# Patient Record
Sex: MALE | Race: WHITE | NOT HISPANIC OR LATINO | Employment: OTHER | URBAN - NONMETROPOLITAN AREA
[De-identification: names, ages, dates, MRNs, and addresses within clinical notes are randomized per-mention and may not be internally consistent; named-entity substitution may affect disease eponyms.]

---

## 2024-08-02 ENCOUNTER — HOSPITAL ENCOUNTER (EMERGENCY)
Facility: HOSPITAL | Age: 39
End: 2024-08-03
Attending: EMERGENCY MEDICINE
Payer: OTHER GOVERNMENT

## 2024-08-02 DIAGNOSIS — T18.128A ESOPHAGEAL OBSTRUCTION DUE TO FOOD IMPACTION: Primary | ICD-10-CM
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
## 2024-08-02 PROCEDURE — 99284 EMERGENCY DEPT VISIT MOD MDM: CPT | Performed by: EMERGENCY MEDICINE

## 2024-08-02 PROCEDURE — 96361 HYDRATE IV INFUSION ADD-ON: CPT

## 2024-08-02 PROCEDURE — 99282 EMERGENCY DEPT VISIT SF MDM: CPT

## 2024-08-02 RX ORDER — NITROGLYCERIN 0.4 MG/1
0.4 TABLET SUBLINGUAL ONCE
Status: COMPLETED | OUTPATIENT
Start: 2024-08-03 | End: 2024-08-03

## 2024-08-02 RX ADMIN — SODIUM CHLORIDE 1000 ML: 0.9 INJECTION, SOLUTION INTRAVENOUS at 23:59

## 2024-08-03 ENCOUNTER — APPOINTMENT (OUTPATIENT)
Dept: GASTROENTEROLOGY | Facility: HOSPITAL | Age: 39
End: 2024-08-03

## 2024-08-03 ENCOUNTER — ANESTHESIA (OUTPATIENT)
Dept: GASTROENTEROLOGY | Facility: HOSPITAL | Age: 39
End: 2024-08-03

## 2024-08-03 ENCOUNTER — HOSPITAL ENCOUNTER (OUTPATIENT)
Facility: HOSPITAL | Age: 39
Setting detail: OBSERVATION
Discharge: HOME | End: 2024-08-03
Attending: FAMILY MEDICINE | Admitting: STUDENT IN AN ORGANIZED HEALTH CARE EDUCATION/TRAINING PROGRAM

## 2024-08-03 ENCOUNTER — ANESTHESIA EVENT (OUTPATIENT)
Dept: GASTROENTEROLOGY | Facility: HOSPITAL | Age: 39
End: 2024-08-03

## 2024-08-03 VITALS
HEART RATE: 110 BPM | WEIGHT: 231 LBS | DIASTOLIC BLOOD PRESSURE: 67 MMHG | OXYGEN SATURATION: 98 % | BODY MASS INDEX: 34.21 KG/M2 | TEMPERATURE: 97.2 F | SYSTOLIC BLOOD PRESSURE: 111 MMHG | HEIGHT: 69 IN | RESPIRATION RATE: 16 BRPM

## 2024-08-03 VITALS
HEIGHT: 69 IN | SYSTOLIC BLOOD PRESSURE: 128 MMHG | HEART RATE: 95 BPM | TEMPERATURE: 97.9 F | OXYGEN SATURATION: 93 % | DIASTOLIC BLOOD PRESSURE: 82 MMHG | RESPIRATION RATE: 17 BRPM | WEIGHT: 231.26 LBS | BODY MASS INDEX: 34.25 KG/M2

## 2024-08-03 DIAGNOSIS — T18.128A ESOPHAGEAL OBSTRUCTION DUE TO FOOD IMPACTION: Primary | ICD-10-CM

## 2024-08-03 DIAGNOSIS — K21.9 GASTROESOPHAGEAL REFLUX DISEASE WITHOUT ESOPHAGITIS: ICD-10-CM
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
## 2024-08-03 PROBLEM — K29.80 DUODENITIS: Status: ACTIVE | Noted: 2024-08-03

## 2024-08-03 PROBLEM — K21.00 GASTROESOPHAGEAL REFLUX DISEASE WITH ESOPHAGITIS WITHOUT HEMORRHAGE: Status: ACTIVE | Noted: 2024-08-03

## 2024-08-03 PROCEDURE — 43239 EGD BIOPSY SINGLE/MULTIPLE: CPT | Performed by: STUDENT IN AN ORGANIZED HEALTH CARE EDUCATION/TRAINING PROGRAM

## 2024-08-03 PROCEDURE — 88305 TISSUE EXAM BY PATHOLOGIST: CPT | Performed by: PATHOLOGY

## 2024-08-03 PROCEDURE — 99222 1ST HOSP IP/OBS MODERATE 55: CPT | Performed by: STUDENT IN AN ORGANIZED HEALTH CARE EDUCATION/TRAINING PROGRAM

## 2024-08-03 PROCEDURE — 99236 HOSP IP/OBS SAME DATE HI 85: CPT | Performed by: STUDENT IN AN ORGANIZED HEALTH CARE EDUCATION/TRAINING PROGRAM

## 2024-08-03 PROCEDURE — G0379 DIRECT REFER HOSPITAL OBSERV: HCPCS

## 2024-08-03 PROCEDURE — 96375 TX/PRO/DX INJ NEW DRUG ADDON: CPT

## 2024-08-03 PROCEDURE — 96374 THER/PROPH/DIAG INJ IV PUSH: CPT

## 2024-08-03 PROCEDURE — 96361 HYDRATE IV INFUSION ADD-ON: CPT

## 2024-08-03 RX ORDER — LIDOCAINE HYDROCHLORIDE 10 MG/ML
INJECTION, SOLUTION EPIDURAL; INFILTRATION; INTRACAUDAL; PERINEURAL AS NEEDED
Status: DISCONTINUED | OUTPATIENT
Start: 2024-08-03 | End: 2024-08-03

## 2024-08-03 RX ORDER — SODIUM CHLORIDE, SODIUM LACTATE, POTASSIUM CHLORIDE, CALCIUM CHLORIDE 600; 310; 30; 20 MG/100ML; MG/100ML; MG/100ML; MG/100ML
INJECTION, SOLUTION INTRAVENOUS CONTINUOUS PRN
Status: DISCONTINUED | OUTPATIENT
Start: 2024-08-03 | End: 2024-08-03

## 2024-08-03 RX ORDER — NORTRIPTYLINE HYDROCHLORIDE 50 MG/1
100 CAPSULE ORAL
COMMUNITY

## 2024-08-03 RX ORDER — MIDAZOLAM HYDROCHLORIDE 2 MG/2ML
INJECTION, SOLUTION INTRAMUSCULAR; INTRAVENOUS AS NEEDED
Status: DISCONTINUED | OUTPATIENT
Start: 2024-08-03 | End: 2024-08-03

## 2024-08-03 RX ORDER — GLYCOPYRROLATE 0.2 MG/ML
INJECTION INTRAMUSCULAR; INTRAVENOUS AS NEEDED
Status: DISCONTINUED | OUTPATIENT
Start: 2024-08-03 | End: 2024-08-03

## 2024-08-03 RX ORDER — TRAZODONE HYDROCHLORIDE 150 MG/1
150 TABLET ORAL
COMMUNITY

## 2024-08-03 RX ORDER — PANTOPRAZOLE SODIUM 40 MG/1
40 TABLET, DELAYED RELEASE ORAL 2 TIMES DAILY
Qty: 60 TABLET | Refills: 0 | Status: SHIPPED | OUTPATIENT
Start: 2024-08-03 | End: 2024-09-02

## 2024-08-03 RX ORDER — ONDANSETRON 2 MG/ML
INJECTION INTRAMUSCULAR; INTRAVENOUS AS NEEDED
Status: DISCONTINUED | OUTPATIENT
Start: 2024-08-03 | End: 2024-08-03

## 2024-08-03 RX ORDER — METOCLOPRAMIDE HYDROCHLORIDE 5 MG/ML
INJECTION INTRAMUSCULAR; INTRAVENOUS AS NEEDED
Status: DISCONTINUED | OUTPATIENT
Start: 2024-08-03 | End: 2024-08-03

## 2024-08-03 RX ORDER — PROPOFOL 10 MG/ML
INJECTION, EMULSION INTRAVENOUS AS NEEDED
Status: DISCONTINUED | OUTPATIENT
Start: 2024-08-03 | End: 2024-08-03

## 2024-08-03 RX ORDER — ONDANSETRON 2 MG/ML
4 INJECTION INTRAMUSCULAR; INTRAVENOUS ONCE
Status: COMPLETED | OUTPATIENT
Start: 2024-08-03 | End: 2024-08-03

## 2024-08-03 RX ADMIN — SODIUM CHLORIDE, SODIUM LACTATE, POTASSIUM CHLORIDE, AND CALCIUM CHLORIDE: .6; .31; .03; .02 INJECTION, SOLUTION INTRAVENOUS at 11:13

## 2024-08-03 RX ADMIN — METOCLOPRAMIDE 10 MG: 5 INJECTION, SOLUTION INTRAMUSCULAR; INTRAVENOUS at 11:13

## 2024-08-03 RX ADMIN — PROPOFOL 50 MG: 10 INJECTION, EMULSION INTRAVENOUS at 11:19

## 2024-08-03 RX ADMIN — MIDAZOLAM 2 MG: 1 INJECTION INTRAMUSCULAR; INTRAVENOUS at 11:13

## 2024-08-03 RX ADMIN — ONDANSETRON 4 MG: 2 INJECTION INTRAMUSCULAR; INTRAVENOUS at 00:15

## 2024-08-03 RX ADMIN — GLYCOPYRROLATE 0.2 MG: 0.2 INJECTION, SOLUTION INTRAMUSCULAR; INTRAVENOUS at 11:13

## 2024-08-03 RX ADMIN — NITROGLYCERIN 0.4 MG: 0.4 TABLET SUBLINGUAL at 00:01

## 2024-08-03 RX ADMIN — PROPOFOL 200 MG: 10 INJECTION, EMULSION INTRAVENOUS at 11:17

## 2024-08-03 RX ADMIN — GLUCAGON 1 MG: 1 INJECTION, POWDER, LYOPHILIZED, FOR SOLUTION INTRAMUSCULAR; INTRAVENOUS at 00:01

## 2024-08-03 RX ADMIN — PROPOFOL 100 MG: 10 INJECTION, EMULSION INTRAVENOUS at 11:22

## 2024-08-03 RX ADMIN — LIDOCAINE HYDROCHLORIDE 100 MG: 10 INJECTION, SOLUTION EPIDURAL; INFILTRATION; INTRACAUDAL; PERINEURAL at 11:17

## 2024-08-03 RX ADMIN — ONDANSETRON 4 MG: 2 INJECTION INTRAMUSCULAR; INTRAVENOUS at 11:13

## 2024-08-03 NOTE — ASSESSMENT & PLAN NOTE
Patient presents as a transfer from [facility] for GI evaluation for esophageal obstruction due to food impaction.   At [facility], received SL Nitro and Glucagon with attempt to dislodge food bolus with carbonated beverage however this was unsuccessful  Transferred for EGD by GI today  Keep NPO until EGD completed

## 2024-08-03 NOTE — DISCHARGE SUMMARY
[facility]  Discharge- Carlos Fay 1985, 39 y.o. male MRN: [id number]  Unit/Bed#: [place] [address] -01 Encounter: [id number]  Primary Care Provider: No primary care provider on file.   Date and time admitted to hospital: 8/3/2024  9:36 AM    * Esophageal obstruction due to food impaction  Assessment & Plan  Patient presents as a transfer from [facility] for GI evaluation for esophageal obstruction due to food impaction.   At [facility], received SL Nitro and Glucagon with attempt to dislodge food bolus with carbonated beverage however this was unsuccessful  Transferred for EGD by GI today, no food impaction found  Found to have esophagitis in GE junction and duodenitis for which biopsies were performed  Random biopsy of stomach performed to rule out H. Pylori  Follow-up with GI in 1-2 weeks  Rest of plan under GERD/duodenitis    Duodenitis  Assessment & Plan  Found on EGD today  Biopsies taken  Outpatient follow-up with GI  Start Pantoprazole 40 mg twice daily x 1 month    Gastroesophageal reflux disease with esophagitis without hemorrhage  Assessment & Plan  EGD showing Esophagitis, biopsies performed  Start PPI twice daily for 1 month  Outpatient follow-up with GI in 1-2 weeks      Medical Problems      Discharging Physician / Practitioner: Umu Benavides MD  PCP: No primary care provider on file.  Admission Date:   Admission Orders (From admission, onward)       Ordered        08/03/24 1051  Place in Observation  Once                          Discharge Date: 08/03/24    Consultations During Hospital Stay:  GI    Procedures Performed:   EGD    Significant Findings / Test Results: EGD  Grade B esophagitis in the GE junction  Mild erythematous mucosa in the duodenal bulb and 1st part of the duodenum  The upper third of the esophagus and middle third of the esophagus appeared normal. Performed random biopsy to rule out eosinophilic esophagitis.  The cardia, fundus of the  "stomach, body of the stomach, greater curve of the stomach, lesser curve of the stomach, incisura, antrum, prepyloric region and pylorus appeared normal. Performed random biopsy to rule out H. pylori.  The 2nd part of the duodenum appeared normal.    Test Results Pending at Discharge (will require follow up):   Biopsies from esophagus, duodenum and stomach     Outpatient Tests Requested:  Will likely need repeat EGD to ensure resolution of above    Complications:  none    Reason for Admission: esophageal food impaction    Hospital Course:   Carlos Fay is a 39 y.o. male patient who originally presented to the hospital on 8/3/2024 due to globus sensation and vomiting following heavy meal and eating very fast. Presented to [facility] initially however attempts to relieve patient's symptoms were unsuccessful. Given lack of GI capabilities over the weekend, patient was transferred to [facility] for further evaluation. EGD was performed showing no food impaction and patient found to have esophagitis and duodenitis for which biopsies were performed. Patient stable for discharge home at this time and instructed to start PPI twice daily x 1 month. He will need to follow-up with GI in 1-2 weeks following discharge and will likely need repeat EGD in 6-8 weeks to evaluate for resolution.     Please see above list of diagnoses and related plan for additional information.     Condition at Discharge: stable    Discharge Day Visit / Exam:   Subjective:  Patient seen and examined at bedside. Denies any complaints at this time. States that his throat still feels a bit sore.     Vitals: Blood Pressure: 111/67 (08/03/24 1143)  Pulse: (!) 110 (08/03/24 1143)  Temperature: (!) 97.2 °F (36.2 °C) (08/03/24 1138)  Temp Source: Temporal (08/03/24 1143)  Respirations: 16 (08/03/24 1143)  Height: 5' 9\" (175.3 cm) (08/03/24 0958)  Weight - Scale: 105 kg (231 lb) (08/03/24 0958)  SpO2: 98 % (08/03/24 1143)    Exam:   Physical Exam  Vitals and nursing " note reviewed.   Constitutional:       General: He is not in acute distress.     Appearance: He is well-developed.   HENT:      Head: Normocephalic and atraumatic.   Eyes:      Conjunctiva/sclera: Conjunctivae normal.   Cardiovascular:      Rate and Rhythm: Normal rate and regular rhythm.      Heart sounds: No murmur heard.  Pulmonary:      Effort: Pulmonary effort is normal. No respiratory distress.      Breath sounds: Normal breath sounds.   Abdominal:      Palpations: Abdomen is soft.      Tenderness: There is no abdominal tenderness.   Musculoskeletal:         General: No swelling.      Cervical back: Neck supple.   Skin:     General: Skin is warm and dry.      Capillary Refill: Capillary refill takes less than 2 seconds.   Neurological:      Mental Status: He is alert.   Psychiatric:         Mood and Affect: Mood normal.            Discussion with Family: Updated  (wife, father, and mother) at bedside.    Discharge instructions/Information to patient and family:   See after visit summary for information provided to patient and family.      Provisions for Follow-Up Care:  See after visit summary for information related to follow-up care and any pertinent home health orders.      Mobility at time of Discharge:   Basic Mobility Inpatient Raw Score: 24  JH-HLM Goal: 8: Walk 250 feet or more  JH-HLM Achieved: 7: Walk 25 feet or more  Ambulates with no issues     Disposition:   Home    Planned Readmission: none     Discharge Statement:  I spent 65 minutes discharging the patient. This time was spent on the day of discharge. I had direct contact with the patient on the day of discharge. Greater than 50% of the total time was spent examining patient, answering all patient questions, arranging and discussing plan of care with patient as well as directly providing post-discharge instructions.  Additional time then spent on discharge activities.    Discharge Medications:  See after visit summary for  reconciled discharge medications provided to patient and/or family.      **Please Note: This note may have been constructed using a voice recognition system**

## 2024-08-03 NOTE — ASSESSMENT & PLAN NOTE
Patient presents as a transfer from [facility] for GI evaluation for esophageal obstruction due to food impaction.   At [facility], received SL Nitro and Glucagon with attempt to dislodge food bolus with carbonated beverage however this was unsuccessful  Transferred for EGD by GI today, no food impaction found  Found to have esophagitis in GE junction and duodenitis for which biopsies were performed  Random biopsy of stomach performed to rule out H. Pylori  Follow-up with GI in 1-2 weeks  Rest of plan under GERD/duodenitis

## 2024-08-03 NOTE — H&P
[facility]  H&P  Name: Carlos Fay 39 y.o. male I MRN: [id number]  Unit/Bed#: [address] 201-01 I Date of Admission: 8/3/2024   Date of Service: 8/3/2024 I Hospital Day: 0      Assessment & Plan   * Esophageal obstruction due to food impaction  Assessment & Plan  Patient presents as a transfer from [facility] for GI evaluation for esophageal obstruction due to food impaction.   At [facility] [place], received SL Nitro and Glucagon with attempt to dislodge food bolus with carbonated beverage however this was unsuccessful  Transferred for EGD by GI today  Keep NPO until EGD completed    Gastroesophageal reflux disease without esophagitis  Assessment & Plan  Not maintained on any medications  F/u EGD results           VTE Pharmacologic Prophylaxis: VTE Score: 1 Low Risk (Score 0-2) - Encourage Ambulation.  Code Status: Level 1 - Full Code   Discussion with family: Updated  (wife, father, and mother) at bedside.    Anticipated Length of Stay: Patient will be admitted on an observation basis with an anticipated length of stay of less than 2 midnights secondary to esophageal obstruction.    Chief Complaint: globus sensation in throat after eating    History of Present Illness:  Carlos Fay is a 39 y.o. male with a PMH of GERD who presents to [facility] [place] with globus sensation associated with vomiting. States he had steaks, heavy meal overnight and ate this very fast as this was what he was used to in the . Following this, he felt food was stuck and he was unable to remove this prompting him to seek care at [facility] [place] ED. In [facility] patient was given glucagon, nitro, Zofran and carbonated beverage to attempt to dislodge food however no improvement prompting transfer to [facility] for GI evaluation and EGD.     Review of Systems:  Review of Systems   Constitutional:  Negative for chills and fever.   HENT:  Negative for ear pain and sore throat.    Eyes:  Negative for pain and visual disturbance.   Respiratory:  " Negative for cough and shortness of breath.    Cardiovascular:  Negative for chest pain and palpitations.   Gastrointestinal:  Positive for vomiting. Negative for abdominal pain.   Genitourinary:  Negative for dysuria and hematuria.   Musculoskeletal:  Negative for arthralgias and back pain.   Skin:  Negative for color change and rash.   Neurological:  Negative for seizures and syncope.   All other systems reviewed and are negative.        Past Medical and Surgical History:   No past medical history on file.    No past surgical history on file.    Meds/Allergies:  Prior to Admission medications    Medication Sig Start Date End Date Taking? Authorizing Provider   nortriptyline (PAMELOR) 50 mg capsule Take 100 mg by mouth daily at bedtime    Historical Provider, MD   traZODone (DESYREL) 150 mg tablet Take 150 mg by mouth daily at bedtime    Historical Provider, MD     I have reviewed home medications with patient personally.    Allergies: No Known Allergies    Social History:  Marital Status: /Civil Union   Patient Pre-hospital Living Situation: Home  Patient Pre-hospital Level of Mobility: walks  Patient Pre-hospital Diet Restrictions: none  Substance Use History:   Social History     Substance and Sexual Activity   Alcohol Use None     Social History     Tobacco Use   Smoking Status Never   Smokeless Tobacco Never     Social History     Substance and Sexual Activity   Drug Use Yes    Types: Marijuana       Family History:  No family history on file.    Physical Exam:     Vitals:   Blood Pressure: (!) 175/81 (08/03/24 0944)  Pulse: 93 (08/03/24 0944)  Temperature: 98 °F (36.7 °C) (08/03/24 0944)  Respirations: 18 (08/03/24 0944)  Height: 5' 9\" (175.3 cm) (08/03/24 0958)  Weight - Scale: 105 kg (231 lb) (08/03/24 0958)  SpO2: 98 % (08/03/24 0944)    Physical Exam  Vitals and nursing note reviewed.   Constitutional:       General: He is not in acute distress.     Appearance: He is well-developed.   HENT:      " "Head: Normocephalic and atraumatic.   Eyes:      Conjunctiva/sclera: Conjunctivae normal.   Cardiovascular:      Rate and Rhythm: Normal rate and regular rhythm.      Heart sounds: No murmur heard.  Pulmonary:      Effort: Pulmonary effort is normal. No respiratory distress.      Breath sounds: Normal breath sounds.   Abdominal:      Palpations: Abdomen is soft.      Tenderness: There is no abdominal tenderness.   Musculoskeletal:         General: No swelling.      Cervical back: Neck supple.   Skin:     General: Skin is warm and dry.      Capillary Refill: Capillary refill takes less than 2 seconds.   Neurological:      Mental Status: He is alert.   Psychiatric:         Mood and Affect: Mood normal.            Additional Data:     Lab Results:                  No results found for: \"HGBA1C\"        Lines/Drains:  Invasive Devices       Peripheral Intravenous Line  Duration             Peripheral IV 08/02/24 Right Antecubital <1 day                            EKG and Other Studies Reviewed on Admission:   EKG: No EKG obtained.    ** Please Note: This note has been constructed using a voice recognition system. **    "

## 2024-08-03 NOTE — CONSULTS
Consultation -  [facility]  Carlos Fay 39 y.o. male MRN: [id number]  Unit/Bed#: [place] [address] -01 Encounter: [id number]            Inpatient consult to gastroenterology     Date/Time  8/3/2024 9:36 AM     Performed by  Bryson Ozuna MD   Authorized by  Zeny Trevino MD             Reason for Consult / Principal Problem:   Esophageal food impaction       ASSESSMENT AND PLAN:    38 yo M with history of GERD who presented to [facility] on 8/2 due to vomiting and globus sensation overall concerning for esophageal food bolus impaction with plan for EGD to evaluate.    1.  Globus sensation  2.  Dysphagia  3.  Esophageal food bolus impaction  - Keep patient n.p.o.  - Plan for EGD to evaluate    ______________________________________________________________________    HPI:  Carlos Fay is our 38 yo M with history of GERD who presented to [facility] on 8/2 due to vomiting and globus sensation.  He reportedly ate Italian sausage and noodles last night about 2 hours prior to ED arrival.  He reports he is eating quite quickly and began feeling as if food was stuck shortly after ingestion.  Multiple episodes of vomiting and unable to take po, but has improved slightly since arrival at [facility]. He reports 1 other previous episode of possible esophageal food obstruction.  Received glucagon, nitroglycerin and Zofran with continued symptoms.  He was transferred to [facility] on 8/3 given there is no [facility] GI coverage over the weekend.  He previously has seen GI last year for GERD and rectal bleeding at which time rectal exam showed large internal hemorrhoids and he was prescribed Anusol. Denies abd pain, diarrhea, constipation. Some heartburn at baseline, does not take anything for therapy.       REVIEW OF SYSTEMS:    CONSTITUTIONAL: Denies any fever, chills, rigors, and weight loss.  HEENT: No earache or tinnitus. Denies hearing loss or visual disturbances.  CARDIOVASCULAR: No chest pain or palpitations.  "  RESPIRATORY: Denies any cough, hemoptysis, shortness of breath or dyspnea on exertion.  GASTROINTESTINAL: As noted in the History of Present Illness.   GENITOURINARY: No problems with urination. Denies any hematuria or dysuria.  NEUROLOGIC: No dizziness or vertigo, denies headaches.   MUSCULOSKELETAL: Denies any muscle or joint pain.   SKIN: Denies skin rashes or itching.   ENDOCRINE: Denies excessive thirst. Denies intolerance to heat or cold.  PSYCHOSOCIAL: Denies depression or anxiety. Denies any recent memory loss.       Historical Information   No past medical history on file.  No past surgical history on file.  Social History   Social History     Substance and Sexual Activity   Alcohol Use None     Social History     Substance and Sexual Activity   Drug Use Yes    Types: Marijuana     Social History     Tobacco Use   Smoking Status Never   Smokeless Tobacco Never     No family history on file.    Meds/Allergies       Medications Prior to Admission:     nortriptyline (PAMELOR) 50 mg capsule    traZODone (DESYREL) 150 mg tablet  No current facility-administered medications for this encounter.       No Known Allergies        Objective     Blood pressure (!) 175/81, pulse 93, temperature 98 °F (36.7 °C), resp. rate 18, height 5' 9\" (1.753 m), weight 105 kg (231 lb), SpO2 98%. Body mass index is 34.11 kg/m².    No intake or output data in the 24 hours ending 08/03/24 1046        PHYSICAL EXAM:      General Appearance:   Alert, cooperative, no distress   HEENT:   Normocephalic, atraumatic, anicteric.     Neck:  Supple, symmetrical, trachea midline   Lungs:   No respiratory distress   Heart::   Regular rate and rhythm per monitor   Abdomen:   Soft, non-tender, non-distended; normal bowel sounds; no masses, no organomegaly    Genitalia:   Deferred    Rectal:   Deferred    Extremities:  No cyanosis, clubbing or edema    Pulses:  Pulses present   Skin:  No jaundice, rashes, or lesions    Lymph nodes:  No palpable " cervical lymphadenopathy        Lab Results:   No visits with results within 1 Day(s) from this visit.   Latest known visit with results is:   No results found for any previous visit.       Imaging Studies: I have personally reviewed pertinent imaging studies.

## 2024-08-03 NOTE — ED PROVIDER NOTES
"History  Chief Complaint   Patient presents with    Foreign Body in Throat    Vomiting     Patient was eating dinner around 8pm, states \"inhaled\" his food. Patient reports feeling of something stuck in his throat. Tried to drink water and felt as if it was getting stuck and would vomit it back up. Been vomiting on and off for the past 2 hrs.      Patient is a 39-year-old male presenting to the emergency department complaining of vomiting with sensation of something stuck in his esophagus, symptoms started after he ate dinner which was Italian sausage with noodles, he reports this happened about 2 hours prior to coming to the ED, he has been trying to drink water but it comes right back up, he does admit that he \"inhales my food\", he was previously in the  and had to eat very quickly, he denies any pain or symptoms prior to eating his dinner, he did have 1 previous episode of possible esophageal foreign body        None       History reviewed. No pertinent past medical history.    History reviewed. No pertinent surgical history.    History reviewed. No pertinent family history.  I have reviewed and agree with the history as documented.    E-Cigarette/Vaping    E-Cigarette Use Never User      E-Cigarette/Vaping Substances    Nicotine No      Social History     Tobacco Use    Smoking status: Never    Smokeless tobacco: Never   Vaping Use    Vaping status: Never Used   Substance Use Topics    Drug use: Yes     Types: Marijuana       Review of Systems   Constitutional: Negative.    HENT: Negative.     Eyes: Negative.    Respiratory: Negative.     Cardiovascular: Negative.    Gastrointestinal:  Positive for nausea and vomiting.   Endocrine: Negative.    Genitourinary: Negative.    Musculoskeletal: Negative.    Skin: Negative.    Allergic/Immunologic: Negative.    Neurological: Negative.    Hematological: Negative.    Psychiatric/Behavioral: Negative.         Physical Exam  Physical Exam  Constitutional:       " Appearance: He is well-developed.   HENT:      Head: Normocephalic and atraumatic.   Eyes:      Conjunctiva/sclera: Conjunctivae normal.      Pupils: Pupils are equal, round, and reactive to light.   Cardiovascular:      Rate and Rhythm: Normal rate.   Pulmonary:      Effort: Pulmonary effort is normal.   Abdominal:      Palpations: Abdomen is soft.   Musculoskeletal:         General: Normal range of motion.      Cervical back: Normal range of motion and neck supple.   Skin:     General: Skin is warm and dry.   Neurological:      Mental Status: He is alert and oriented to person, place, and time.         Vital Signs  ED Triage Vitals [08/02/24 2348]   Temperature Pulse Respirations Blood Pressure SpO2   98.2 °F (36.8 °C) (!) 114 16 155/93 99 %      Temp Source Heart Rate Source Patient Position - Orthostatic VS BP Location FiO2 (%)   Tympanic Monitor Sitting Left arm --      Pain Score       --           Vitals:    08/03/24 0015 08/03/24 0030 08/03/24 0100 08/03/24 0130   BP: 134/74 138/63 122/68 155/74   Pulse: (!) 114  96 (!) 106   Patient Position - Orthostatic VS:             Visual Acuity      ED Medications  Medications   sodium chloride 0.9 % bolus 1,000 mL (1,000 mL Intravenous New Bag 8/2/24 4539)   glucagon (GLUCAGEN) injection 1 mg (1 mg Intravenous Given 8/3/24 0001)   nitroglycerin (NITROSTAT) SL tablet 0.4 mg (0.4 mg Sublingual Given 8/3/24 0001)   ondansetron (ZOFRAN) injection 4 mg (4 mg Intravenous Given 8/3/24 0015)       Diagnostic Studies  Results Reviewed       None                   No orders to display              Procedures  Procedures         ED Course  ED Course as of 08/03/24 0140   Sat Aug 03, 2024   0022 Patient received sublingual nitro and glucagon, attempted to dislodge food bolus with carbonated beverage, unfortunately unsuccessful   0138 Secure chat conversation with on-call GI at [facility], Dr. Burger, in agreement with transfer of patient for esophageal food bolus,  patient subsequently discussed with slim VIOLET, Erik Alvares, who accepts patient on behalf of Dr. Trevino                                               Medical Decision Making  Patient is a 39-year-old male presenting for esophageal food bolus, was eating dinner very quickly when he felt something get stuck, unable to tolerate p.o. intake since then, attempted to dislodge food bolus using sublingual nitro glucagon and soda, unfortunately this was unsuccessful, attempted to reach out to on-call gastroenterology at this campus who is not available in-house on nights or weekends, ultimately patient was discussed with on-call gastroenterology at [facility] and hospitalist service who accepts for transfer  Abdominal exam without peritoneal signs.  No evidence of acute abdomen at this time.   Given work-up, low suspicion for acute hepatobiliary disease (including acute cholecystitis or cholangitis), acute pancreatitis (negative lipase), PUD (including gastric perforation), acute infectious processes (pneumonia, hepatitis, pyelonephritis), acute appendicitis, vascular catastrophe, bowel obstruction, viscus perforation, ovarian/testicular torsion, or diverticulitis.  Presentation not consistent with other acute emergent causes of abdominal pain at this time.    Problems Addressed:  Esophageal obstruction due to food impaction: acute illness or injury    Risk  Prescription drug management.  Decision regarding hospitalization.                 Disposition  Final diagnoses:   Esophageal obstruction due to food impaction     Time reflects when diagnosis was documented in both MDM as applicable and the Disposition within this note       Time User Action Codes Description Comment    8/3/2024  1:25 AM Jesica Barnhart Add [T18.128A,  W44.F3XA] Esophageal obstruction due to food impaction           ED Disposition       ED Disposition   Transfer to Another Facility-In Network    Condition   --    Date/Time   Sat Aug 3, 2024   1:25 AM    Comment   Carloshai Fay should be transferred out to [facility].               MD Documentation      Flowsheet Row Most Recent Value   Patient Condition The patient has been stabilized such that within reasonable medical probability, no material deterioration of the patient condition or the condition of the unborn child(jojo) is likely to result from the transfer   Reason for Transfer Level of Care needed not available at this facility   Benefits of Transfer Specialized equipment and/or services available at the receiving facility (Include comment)________________________   Risks of Transfer Potential for delay in receiving treatment, Potential deterioration of medical condition, Loss of IV, Increased discomfort during transfer, Possible worsening of condition or death during transfer   Accepting Physician Dr Trevino   Accepting Facility Name, City & State [facility] [place]    (Name & Tel number) Vera Braswell   Sending MD Jesica Barnhart   Provider Certification General risk, such as traffic hazards, adverse weather conditions, rough terrain or turbulence, possible failure of equipment (including vehicle or aircraft), or consequences of actions of persons outside the control of the transport personnel, Unanticipated needs of medical equipment and personnel during transport, Risk of worsening condition, The possibility of a transport vehicle being unavailable          RN Documentation      Flowsheet Row Most Recent Value   Accepting Facility Name, City & State [facility] [place]    (Name & Tel number) Vera Braswell          Follow-up Information    None         Patient's Medications    No medications on file       No discharge procedures on file.    PDMP Review       None            ED Provider  Electronically Signed by             Jesica Barnhart DO  08/03/24 0140

## 2024-08-03 NOTE — ANESTHESIA POSTPROCEDURE EVALUATION
"Post-Op Assessment Note    CV Status:  Stable    Pain management: adequate       Mental Status:  Alert and awake   Hydration Status:  Euvolemic   PONV Controlled:  Controlled   Airway Patency:  Patent     Post Op Vitals Reviewed: Yes    No anethesia notable event occurred.    Staff: Anesthesiologist               BP      Temp      Pulse     Resp      SpO2      /67   Pulse (!) 110   Temp (!) 97.2 °F (36.2 °C) (Temporal)   Resp 16   Ht 5' 9\" (1.753 m)   Wt 105 kg (231 lb)   SpO2 98%   BMI 34.11 kg/m²     "

## 2024-08-03 NOTE — ASSESSMENT & PLAN NOTE
Patient presents as a transfer from [facility] for GI evaluation for esophageal obstruction due to food impaction.   At [facility], received SL Nitro and Glucagon with attempt to dislodge food bolus with carbonated beverage however this was unsuccessful  Transferred for EGD by GI today, no food impaction found  Found to have esophagitis in GE junction and duodenitis for which biopsies were performed  Random biopsy of stomach performed to rule out H. Pylori  Follow-up with GI in 1-2 weeks  Rest of plan under GERD/duodenitis

## 2024-08-03 NOTE — DISCHARGE INSTR - AVS FIRST PAGE
Dear Carlos aFy,     It was our pleasure to care for you here at [facility].  It is our hope that we were always able to exceed the expected standards for your care during your stay.  You were hospitalized due to esophageal obstruction .  You were cared for on the 2nd floor under the service of Umu Benavides MD with the [facility] who covers for your primary care physician (PCP), No primary care provider on file., while you were hospitalized.  If you have any questions or concerns related to this hospitalization, you may contact us at .  For follow up as well as medication refills, we recommend that you follow up with your primary care physician.  A registered nurse will reach out to you by phone within a few days after your discharge to answer any additional questions that you may have after going home.  However, at this time we provide for you here, the most important instructions / recommendations at discharge:     Notable Medication Adjustments -   Start Pantoprazole 40 mg twice daily for 1 month or until GI follow-up  Testing Required after Discharge -   Defer decision for need for repeat EGD to GI on follow-up  ** Please contact your PCP to request testing orders for any of the testing recommended here **  Important Follow Up Information -   You will need to follow-up with GI within 1-2 weeks  Other Instructions -   While you are stable for discharge home at this time, your symptoms may change. Please seek immediate medical attention for any concerning symptoms  Recommend small bites, chew food thoroughly, stay upright during meals, drink plenty of water, avoid dry foods, eat slowly   Please review this entire after visit summary as additional general instructions including medication list, appointments, activity, diet, any pertinent wound care, and other additional recommendations from your care team that  may be provided for you.      Sincerely,     Umu Benavides MD

## 2024-08-03 NOTE — ASSESSMENT & PLAN NOTE
Found on EGD today  Biopsies taken  Outpatient follow-up with GI  Start Pantoprazole 40 mg twice daily x 1 month

## 2024-08-03 NOTE — ANESTHESIA PREPROCEDURE EVALUATION
Procedure:  EGD    Relevant Problems   GI/HEPATIC   (+) Gastroesophageal reflux disease without esophagitis        Physical Exam    Airway    Mallampati score: III         Dental       Cardiovascular  Rhythm: regular, Rate: normal    Pulmonary   Breath sounds clear to auscultation    Other Findings        Anesthesia Plan  ASA Score- 2 Emergent    Anesthesia Type- general with ASA Monitors.         Additional Monitors:     Airway Plan:            Plan Factors-Exercise tolerance (METS): >4 METS.    Chart reviewed.   Existing labs reviewed. Patient summary reviewed.    Patient is not a current smoker.      Obstructive sleep apnea risk education given perioperatively.        Induction- intravenous.    Postoperative Plan-     Perioperative Resuscitation Plan - Level 1 - Full Code.       Informed Consent- Anesthetic plan and risks discussed with patient.

## 2024-08-03 NOTE — ASSESSMENT & PLAN NOTE
EGD showing Esophagitis, biopsies performed  Start PPI twice daily for 1 month  Outpatient follow-up with GI in 1-2 weeks

## 2024-08-03 NOTE — EMTALA/ACUTE CARE TRANSFER
[facility] EMERGENCY DEPARTMENT  [address] [facility] [address]  Dept: [phone]      EMTALA TRANSFER CONSENT    NAME Carlos Fay                                         1985                              MRN [id number]    I have been informed of my rights regarding examination, treatment, and transfer   by Dr. Jesica Barnhart DO    Benefits: Specialized equipment and/or services available at the receiving facility (Include comment)________________________    Risks: Potential for delay in receiving treatment, Potential deterioration of medical condition, Loss of IV, Increased discomfort during transfer, Possible worsening of condition or death during transfer      Transfer Request   I acknowledge that my medical condition has been evaluated and explained to me by the emergency department physician or other qualified medical person and/or my attending physician who has recommended and offered to me further medical examination and treatment. I understand the Hospital's obligation with respect to the treatment and stabilization of my emergency medical condition. I nevertheless request to be transferred. I release the Hospital, the doctor, and any other persons caring for me from all responsibility or liability for any injury or ill effects that may result from my transfer and agree to accept all responsibility for the consequences of my choice to transfer, rather than receive stabilizing treatment at the Hospital. I understand that because the transfer is my request, my insurance may not provide reimbursement for the services.  The Hospital will assist and direct me and my family in how to make arrangements for transfer, but the hospital is not liable for any fees charged by the transport service.  In spite of this understanding, I refuse to consent to further medical examination and treatment which has been offered to me, and request transfer to Accepting Facility Name, City &  State : [facility]. I authorize the performance of emergency medical procedures and treatments upon me in both transit and upon arrival at the receiving facility.  Additionally, I authorize the release of any and all medical records to the receiving facility and request they be transported with me, if possible.    I authorize the performance of emergency medical procedures and treatments upon me in both transit and upon arrival at the receiving facility.  Additionally, I authorize the release of any and all medical records to the receiving facility and request they be transported with me, if possible.  I understand that the safest mode of transportation during a medical emergency is an ambulance and that the Hospital advocates the use of this mode of transport. Risks of traveling to the receiving facility by car, including absence of medical control, life sustaining equipment, such as oxygen, and medical personnel has been explained to me and I fully understand them.    (LIZBETH CORRECT BOX BELOW)  [  ]  I consent to the stated transfer and to be transported by ambulance/helicopter.  [  ]  I consent to the stated transfer, but refuse transportation by ambulance and accept full responsibility for my transportation by car.  I understand the risks of non-ambulance transfers and I exonerate the Hospital and its staff from any deterioration in my condition that results from this refusal.    X___________________________________________    DATE  24  TIME________  Signature of patient or legally responsible individual signing on patient behalf           RELATIONSHIP TO PATIENT_________________________          Provider Certification    NAME Carlos DELVIS Fay                                         1985                              MRN [id number]    A medical screening exam was performed on the above named patient.  Based on the examination:    Condition Necessitating Transfer The  encounter diagnosis was Esophageal obstruction due to food impaction.    Patient Condition: The patient has been stabilized such that within reasonable medical probability, no material deterioration of the patient condition or the condition of the unborn child(jojo) is likely to result from the transfer    Reason for Transfer: Level of Care needed not available at this facility    Transfer Requirements: Facility [facility] [place]   Space available and qualified personnel available for treatment as acknowledged by Vera Braswell  Agreed to accept transfer and to provide appropriate medical treatment as acknowledged by       Dr Trevino  Appropriate medical records of the examination and treatment of the patient are provided at the time of transfer   STAFF INITIAL WHEN COMPLETED _______  Transfer will be performed by qualified personnel from    and appropriate transfer equipment as required, including the use of necessary and appropriate life support measures.    Provider Certification: I have examined the patient and explained the following risks and benefits of being transferred/refusing transfer to the patient/family:  General risk, such as traffic hazards, adverse weather conditions, rough terrain or turbulence, possible failure of equipment (including vehicle or aircraft), or consequences of actions of persons outside the control of the transport personnel, Unanticipated needs of medical equipment and personnel during transport, Risk of worsening condition, The possibility of a transport vehicle being unavailable      Based on these reasonable risks and benefits to the patient and/or the unborn child(jojo), and based upon the information available at the time of the patient’s examination, I certify that the medical benefits reasonably to be expected from the provision of appropriate medical treatments at another medical facility outweigh the increasing risks, if any, to the individual’s medical  condition, and in the case of labor to the unborn child, from effecting the transfer.    X____________________________________________ DATE 08/03/24        TIME_______      ORIGINAL - SEND TO MEDICAL RECORDS   COPY - SEND WITH PATIENT DURING TRANSFER

## 2024-08-05 ENCOUNTER — TELEPHONE (OUTPATIENT)
Dept: GASTROENTEROLOGY | Facility: CLINIC | Age: 39
End: 2024-08-05

## 2024-08-05 NOTE — TELEPHONE ENCOUNTER
I called and lm for this patient to see if he would like to schedule a hospital fu with us in [facility] ER (it looks like he already has an appt in [facility])

## 2024-08-05 NOTE — TELEPHONE ENCOUNTER
----- Message from Bryson Ozuna MD sent at 8/3/2024  1:21 PM EDT -----  Hey this is bryson FRASER, this pt was discharged from [facility] today where he presented for esophageal food bolus impaction. I'm not sure if [facility] will be the closest to him, but could we give him a call and get him set up for outpt GI appt with any provider in 4-6 weeks please?    Thanks,    Bryson

## 2024-08-06 DIAGNOSIS — K22.10 EROSIVE ESOPHAGITIS: Primary | ICD-10-CM

## 2024-08-06 PROCEDURE — 88305 TISSUE EXAM BY PATHOLOGIST: CPT | Performed by: PATHOLOGY

## 2024-08-06 RX ORDER — PANTOPRAZOLE SODIUM 40 MG/1
40 TABLET, DELAYED RELEASE ORAL DAILY
Qty: 60 TABLET | Refills: 0 | Status: SHIPPED | OUTPATIENT
Start: 2024-08-31

## 2024-09-04 ENCOUNTER — TELEPHONE (OUTPATIENT)
Age: 39
End: 2024-09-04

## 2024-09-04 NOTE — TELEPHONE ENCOUNTER
Patients GI provider:  Dr. Bryson Ozuna    Number to return call: [phone]    Reason for call: Called pt refused to schedule at this time He want to speak to Dr. Bryson Ozuna as Dr said he is ok and can cancel the appt he scheduled earlier. Please contact pt for further advise.    Scheduled procedure/appointment date if applicable: None

## 2024-12-26 ENCOUNTER — OFFICE VISIT (OUTPATIENT)
Dept: URGENT CARE | Facility: CLINIC | Age: 39
End: 2024-12-26
Payer: OTHER GOVERNMENT

## 2024-12-26 VITALS
HEART RATE: 80 BPM | RESPIRATION RATE: 18 BRPM | DIASTOLIC BLOOD PRESSURE: 72 MMHG | OXYGEN SATURATION: 99 % | TEMPERATURE: 98.4 F | SYSTOLIC BLOOD PRESSURE: 122 MMHG

## 2024-12-26 DIAGNOSIS — J06.9 VIRAL URI WITH COUGH: Primary | ICD-10-CM

## 2024-12-26 PROCEDURE — G0463 HOSPITAL OUTPT CLINIC VISIT: HCPCS

## 2024-12-26 PROCEDURE — 99213 OFFICE O/P EST LOW 20 MIN: CPT

## 2024-12-26 NOTE — PROGRESS NOTES
[facility]        NAME: Carlos Fay is a 39 y.o. male  : 1985    MRN: [id number]  DATE: 2024  TIME: 2:32 PM    Assessment and Plan   Viral URI with cough [J06.9]  1. Viral URI with cough          Viral URI with cough.  Advised over-the-counter cold flu medication as well as pushing fluids.  Discussed return precautions.  Discussed side effects of using nasal decongestants    Patient Instructions       Follow up with PCP in 3-5 days.  Proceed to  ER if symptoms worsen.    If tests are performed, our office will contact you with results only if changes need to made to the care plan discussed with you at the visit. You can review your full results on [facility].    Chief Complaint     Chief Complaint   Patient presents with   • Cold Like Symptoms     C/o cough and body aches. Onset yesterday.         History of Present Illness       39-year-old female presents with c/o cough and body aches. Onset yesterday.  Wife and mother were recently sick with similar symptoms which are improving.  Has been using decongestant nasal spray which does help for his sinus pressure and congestion.  Cough is nonproductive and denies any shortness of breath, wheezing, abdominal complaints      Review of Systems   Review of Systems   Constitutional:  Positive for chills and fatigue. Negative for appetite change and fever.   HENT:  Positive for congestion, postnasal drip and rhinorrhea. Negative for ear pain, sinus pressure, sinus pain and sore throat.    Respiratory:  Positive for cough. Negative for shortness of breath, wheezing and stridor.    Cardiovascular:  Negative for chest pain and palpitations.   Gastrointestinal:  Negative for abdominal pain, constipation, diarrhea, nausea and vomiting.   Musculoskeletal:  Positive for myalgias.   Neurological:  Positive for headaches. Negative for dizziness, syncope and light-headedness.         Current Medications       Current Outpatient  Medications:   •  nortriptyline (PAMELOR) 50 mg capsule, Take 100 mg by mouth daily at bedtime, Disp: , Rfl:   •  traZODone (DESYREL) 150 mg tablet, Take 150 mg by mouth daily at bedtime, Disp: , Rfl:   •  pantoprazole (PROTONIX) 40 mg tablet, Take 1 tablet (40 mg total) by mouth 2 (two) times a day, Disp: 60 tablet, Rfl: 0  •  pantoprazole (PROTONIX) 40 mg tablet, Take 1 tablet (40 mg total) by mouth daily Do not start before August 31, 2024. (Patient not taking: Reported on 12/26/2024), Disp: 60 tablet, Rfl: 0    Current Allergies     Allergies as of 12/26/2024   • (No Known Allergies)            The following portions of the patient's history were reviewed and updated as appropriate: allergies, current medications, past family history, past medical history, past social history, past surgical history and problem list.     Past Medical History:   Diagnosis Date   • Depression        Past Surgical History:   Procedure Laterality Date   • HERNIA REPAIR         History reviewed. No pertinent family history.      Medications have been verified.        Objective   /72   Pulse 80   Temp 98.4 °F (36.9 °C)   Resp 18   SpO2 99%        Physical Exam     Physical Exam  Vitals and nursing note reviewed.   Constitutional:       General: He is not in acute distress.     Appearance: Normal appearance. He is normal weight. He is not ill-appearing, toxic-appearing or diaphoretic.   HENT:      Head: Normocephalic and atraumatic.      Right Ear: Tympanic membrane, ear canal and external ear normal. There is no impacted cerumen.      Left Ear: Tympanic membrane, ear canal and external ear normal. There is no impacted cerumen.      Nose: Congestion and rhinorrhea present.      Mouth/Throat:      Mouth: Mucous membranes are moist.      Pharynx: Oropharynx is clear. Posterior oropharyngeal erythema present. No oropharyngeal exudate.   Eyes:      General: No scleral icterus.        Right eye: No discharge.         Left eye: No  discharge.      Extraocular Movements: Extraocular movements intact.      Conjunctiva/sclera: Conjunctivae normal.      Pupils: Pupils are equal, round, and reactive to light.   Neck:      Vascular: No carotid bruit.   Cardiovascular:      Rate and Rhythm: Normal rate and regular rhythm.      Pulses: Normal pulses.      Heart sounds: Normal heart sounds. No murmur heard.     No friction rub. No gallop.   Pulmonary:      Effort: Pulmonary effort is normal. No respiratory distress.      Breath sounds: Normal breath sounds. No stridor. No wheezing, rhonchi or rales.   Chest:      Chest wall: No tenderness.   Musculoskeletal:      Cervical back: Normal range of motion and neck supple. No rigidity or tenderness.   Lymphadenopathy:      Cervical: No cervical adenopathy.   Neurological:      Mental Status: He is alert.